# Patient Record
Sex: MALE | Race: WHITE | Employment: FULL TIME | URBAN - METROPOLITAN AREA
[De-identification: names, ages, dates, MRNs, and addresses within clinical notes are randomized per-mention and may not be internally consistent; named-entity substitution may affect disease eponyms.]

---

## 2017-10-02 ENCOUNTER — PATIENT MESSAGE (OUTPATIENT)
Dept: FAMILY MEDICINE CLINIC | Facility: CLINIC | Age: 47
End: 2017-10-02

## 2017-10-02 NOTE — TELEPHONE ENCOUNTER
From: Angel Pradhan  To: Annie Agee MD  Sent: 10/2/2017 11:00 AM CDT  Subject: Other    I'm late setting up a physical for this year - need one done by Nov. 1st, we are now required to have one annually to receive the discounted insurance rate.  In orde

## 2017-10-09 ENCOUNTER — OFFICE VISIT (OUTPATIENT)
Dept: FAMILY MEDICINE CLINIC | Facility: CLINIC | Age: 47
End: 2017-10-09

## 2017-10-09 VITALS
HEIGHT: 78 IN | BODY MASS INDEX: 21.75 KG/M2 | SYSTOLIC BLOOD PRESSURE: 106 MMHG | HEART RATE: 64 BPM | OXYGEN SATURATION: 98 % | RESPIRATION RATE: 16 BRPM | DIASTOLIC BLOOD PRESSURE: 78 MMHG | TEMPERATURE: 98 F | WEIGHT: 188 LBS

## 2017-10-09 DIAGNOSIS — Z00.00 ANNUAL PHYSICAL EXAM: Primary | ICD-10-CM

## 2017-10-09 PROCEDURE — 99396 PREV VISIT EST AGE 40-64: CPT | Performed by: FAMILY MEDICINE

## 2017-10-09 NOTE — H&P
Raj Guerrero is a 41year old male who presents for a complete physical exam.   HPI:   Pt complains of nothing. .         Current Outpatient Prescriptions:  Fish Oil Does not apply Oil by Does not apply route.  Disp:  Rfl:    Cholecalciferol (VITAMIN D) 2000 easy bruising or bleeding  ENDOCRINE:denies frequent thirst or urination, denies unintentional weight gain/loss  ALL/ASTHMA: denies hx of allergy or asthma    EXAM:   /78   Pulse 64   Temp 97.6 °F (36.4 °C) (Oral)   Resp 16   Ht 80\"   Wt 188 lb   Sp

## 2018-01-19 ENCOUNTER — TELEPHONE (OUTPATIENT)
Dept: FAMILY MEDICINE CLINIC | Facility: CLINIC | Age: 48
End: 2018-01-19

## 2018-01-19 RX ORDER — OSELTAMIVIR PHOSPHATE 75 MG/1
75 CAPSULE ORAL DAILY
Qty: 10 CAPSULE | Refills: 0 | Status: SHIPPED | OUTPATIENT
Start: 2018-01-19 | End: 2018-08-17

## 2018-01-19 NOTE — TELEPHONE ENCOUNTER
Son diagnosed with Influenza A today. He is asymptomatic. Wants to know if he should be on Tamiflu.  Please advise

## 2018-08-16 NOTE — PROGRESS NOTES
HPI:   Terra Mena is a 57year old male here for complaints of back pain. Pain is located at low back, mid back. Pain is described as aching, sharp, shooting. Severity is moderate. The pain radiates to no radiation of pain.  Pain was precipitated by unknown above  NEURO: denies paresthesias or LE weakness    EXAM:   /62   Pulse 90   Temp 97.9 °F (36.6 °C) (Oral)   Resp 16   Wt 185 lb   SpO2 99%   BMI 20.32 kg/m²     Body mass index is 20.32 kg/m².   GENERAL: well developed, well nourished,in no apparent

## 2018-08-17 ENCOUNTER — HOSPITAL ENCOUNTER (OUTPATIENT)
Dept: GENERAL RADIOLOGY | Age: 48
Discharge: HOME | End: 2018-08-17
Attending: PHYSICIAN ASSISTANT
Payer: COMMERCIAL

## 2018-08-17 ENCOUNTER — OFFICE VISIT (OUTPATIENT)
Dept: FAMILY MEDICINE CLINIC | Facility: CLINIC | Age: 48
End: 2018-08-17
Payer: COMMERCIAL

## 2018-08-17 VITALS
TEMPERATURE: 98 F | HEART RATE: 90 BPM | DIASTOLIC BLOOD PRESSURE: 62 MMHG | OXYGEN SATURATION: 99 % | SYSTOLIC BLOOD PRESSURE: 108 MMHG | WEIGHT: 185 LBS | RESPIRATION RATE: 16 BRPM | BODY MASS INDEX: 20 KG/M2

## 2018-08-17 DIAGNOSIS — G89.29 CHRONIC BILATERAL LOW BACK PAIN WITHOUT SCIATICA: ICD-10-CM

## 2018-08-17 DIAGNOSIS — G89.29 CHRONIC BILATERAL LOW BACK PAIN WITHOUT SCIATICA: Primary | ICD-10-CM

## 2018-08-17 DIAGNOSIS — M54.50 CHRONIC BILATERAL LOW BACK PAIN WITHOUT SCIATICA: ICD-10-CM

## 2018-08-17 DIAGNOSIS — M54.9 MID BACK PAIN: ICD-10-CM

## 2018-08-17 DIAGNOSIS — M54.50 CHRONIC BILATERAL LOW BACK PAIN WITHOUT SCIATICA: Primary | ICD-10-CM

## 2018-08-17 PROCEDURE — 99214 OFFICE O/P EST MOD 30 MIN: CPT | Performed by: PHYSICIAN ASSISTANT

## 2018-08-17 PROCEDURE — 72072 X-RAY EXAM THORAC SPINE 3VWS: CPT | Performed by: PHYSICIAN ASSISTANT

## 2018-08-17 PROCEDURE — 72110 X-RAY EXAM L-2 SPINE 4/>VWS: CPT | Performed by: PHYSICIAN ASSISTANT

## 2018-08-17 RX ORDER — CYCLOBENZAPRINE HCL 5 MG
5 TABLET ORAL NIGHTLY
Qty: 10 TABLET | Refills: 0 | Status: SHIPPED | OUTPATIENT
Start: 2018-08-17

## 2018-09-10 ENCOUNTER — OFFICE VISIT (OUTPATIENT)
Dept: FAMILY MEDICINE CLINIC | Facility: CLINIC | Age: 48
End: 2018-09-10
Payer: COMMERCIAL

## 2018-09-10 VITALS
DIASTOLIC BLOOD PRESSURE: 68 MMHG | OXYGEN SATURATION: 99 % | SYSTOLIC BLOOD PRESSURE: 110 MMHG | HEART RATE: 68 BPM | RESPIRATION RATE: 20 BRPM | TEMPERATURE: 98 F | WEIGHT: 185 LBS | BODY MASS INDEX: 21.4 KG/M2 | HEIGHT: 78 IN

## 2018-09-10 DIAGNOSIS — Z23 NEED FOR TDAP VACCINATION: ICD-10-CM

## 2018-09-10 DIAGNOSIS — Z00.00 ANNUAL PHYSICAL EXAM: Primary | ICD-10-CM

## 2018-09-10 PROCEDURE — 90471 IMMUNIZATION ADMIN: CPT | Performed by: FAMILY MEDICINE

## 2018-09-10 PROCEDURE — 90715 TDAP VACCINE 7 YRS/> IM: CPT | Performed by: FAMILY MEDICINE

## 2018-09-10 PROCEDURE — 99396 PREV VISIT EST AGE 40-64: CPT | Performed by: FAMILY MEDICINE

## 2018-09-10 NOTE — H&P
Joel Fernandes is a 57year old male who presents for a complete physical exam.   HPI:   Pt complains of nothing. Current Outpatient Medications:  Fish Oil Does not apply Oil by Does not apply route.  Disp:  Rfl:    Cholecalciferol (VITAMIN D) 2000 UNITS denies depression or anxiety  HEMATOLOGIC: denies hx of anemia, easy bruising or bleeding  ENDOCRINE:denies frequent thirst or urination, denies unintentional weight gain/loss  ALL/ASTHMA: denies hx of allergy or asthma    EXAM:   /68   Pulse 68   Te Status: Future          Standing Expiration Date: 9/10/2019      Comp Metabolic Panel (14)          Standing Status: Future          Standing Expiration Date: 9/10/2019      Vitamin D, 25-Hydroxy [E]          Standing Status: Future          Standing Expir

## 2018-09-14 ENCOUNTER — LAB ENCOUNTER (OUTPATIENT)
Dept: LAB | Age: 48
End: 2018-09-14
Attending: FAMILY MEDICINE
Payer: COMMERCIAL

## 2018-09-14 DIAGNOSIS — Z00.00 ANNUAL PHYSICAL EXAM: ICD-10-CM

## 2018-09-14 LAB
ALBUMIN SERPL-MCNC: 4.1 G/DL (ref 3.5–4.8)
ALBUMIN/GLOB SERPL: 1.2 {RATIO} (ref 1–2)
ALP LIVER SERPL-CCNC: 101 U/L (ref 45–117)
ALT SERPL-CCNC: 36 U/L (ref 17–63)
ANION GAP SERPL CALC-SCNC: 4 MMOL/L (ref 0–18)
AST SERPL-CCNC: 30 U/L (ref 15–41)
BASOPHILS # BLD AUTO: 0.04 X10(3) UL (ref 0–0.1)
BASOPHILS NFR BLD AUTO: 0.8 %
BILIRUB SERPL-MCNC: 1.5 MG/DL (ref 0.1–2)
BUN BLD-MCNC: 21 MG/DL (ref 8–20)
BUN/CREAT SERPL: 20.2 (ref 10–20)
CALCIUM BLD-MCNC: 9 MG/DL (ref 8.3–10.3)
CHLORIDE SERPL-SCNC: 105 MMOL/L (ref 101–111)
CHOLEST SMN-MCNC: 124 MG/DL (ref ?–200)
CO2 SERPL-SCNC: 31 MMOL/L (ref 22–32)
COMPLEXED PSA SERPL-MCNC: 0.88 NG/ML (ref 0.01–4)
CREAT BLD-MCNC: 1.04 MG/DL (ref 0.7–1.3)
EOSINOPHIL # BLD AUTO: 0.19 X10(3) UL (ref 0–0.3)
EOSINOPHIL NFR BLD AUTO: 3.7 %
ERYTHROCYTE [DISTWIDTH] IN BLOOD BY AUTOMATED COUNT: 12.6 % (ref 11.5–16)
GLOBULIN PLAS-MCNC: 3.4 G/DL (ref 2.5–4)
GLUCOSE BLD-MCNC: 84 MG/DL (ref 70–99)
HCT VFR BLD AUTO: 44.8 % (ref 37–53)
HDLC SERPL-MCNC: 29 MG/DL (ref 40–59)
HGB BLD-MCNC: 14.4 G/DL (ref 13–17)
IMMATURE GRANULOCYTE COUNT: 0.01 X10(3) UL (ref 0–1)
IMMATURE GRANULOCYTE RATIO %: 0.2 %
LDLC SERPL CALC-MCNC: 77 MG/DL (ref ?–100)
LYMPHOCYTES # BLD AUTO: 1.38 X10(3) UL (ref 0.9–4)
LYMPHOCYTES NFR BLD AUTO: 26.6 %
M PROTEIN MFR SERPL ELPH: 7.5 G/DL (ref 6.1–8.3)
MCH RBC QN AUTO: 29.1 PG (ref 27–33.2)
MCHC RBC AUTO-ENTMCNC: 32.1 G/DL (ref 31–37)
MCV RBC AUTO: 90.5 FL (ref 80–99)
MONOCYTES # BLD AUTO: 0.52 X10(3) UL (ref 0.1–1)
MONOCYTES NFR BLD AUTO: 10 %
NEUTROPHIL ABS PRELIM: 3.05 X10 (3) UL (ref 1.3–6.7)
NEUTROPHILS # BLD AUTO: 3.05 X10(3) UL (ref 1.3–6.7)
NEUTROPHILS NFR BLD AUTO: 58.7 %
NONHDLC SERPL-MCNC: 95 MG/DL (ref ?–130)
OSMOLALITY SERPL CALC.SUM OF ELEC: 292 MOSM/KG (ref 275–295)
PLATELET # BLD AUTO: 226 10(3)UL (ref 150–450)
POTASSIUM SERPL-SCNC: 4.4 MMOL/L (ref 3.6–5.1)
RBC # BLD AUTO: 4.95 X10(6)UL (ref 4.3–5.7)
RED CELL DISTRIBUTION WIDTH-SD: 41.5 FL (ref 35.1–46.3)
SODIUM SERPL-SCNC: 140 MMOL/L (ref 136–144)
TRIGL SERPL-MCNC: 90 MG/DL (ref 30–149)
VIT D+METAB SERPL-MCNC: 34.7 NG/ML (ref 30–100)
VLDLC SERPL CALC-MCNC: 18 MG/DL (ref 0–30)
WBC # BLD AUTO: 5.2 X10(3) UL (ref 4–13)

## 2018-09-14 PROCEDURE — 36415 COLL VENOUS BLD VENIPUNCTURE: CPT

## 2018-09-14 PROCEDURE — 82306 VITAMIN D 25 HYDROXY: CPT

## 2018-09-14 PROCEDURE — 85025 COMPLETE CBC W/AUTO DIFF WBC: CPT

## 2018-09-14 PROCEDURE — 80061 LIPID PANEL: CPT

## 2018-09-14 PROCEDURE — 80053 COMPREHEN METABOLIC PANEL: CPT

## 2018-09-29 ENCOUNTER — TELEPHONE (OUTPATIENT)
Dept: FAMILY MEDICINE CLINIC | Facility: CLINIC | Age: 48
End: 2018-09-29

## 2018-09-29 NOTE — TELEPHONE ENCOUNTER
PT DROPPED FORM OFF FOR ANNUAL PX VERIFICATION. HAD PX 2 WEEKS AGO. PLEASE CALL PT WHEN READY FOR . FORM IN YP TRIAGE BIN    ALSO PLEASE CALL PT ON CELL TO DISCUSS LAB RESULTS.

## 2018-10-02 NOTE — TELEPHONE ENCOUNTER
Form completed and left at  for . Copy sent to scan, copy in triage accordian, pt notified,      See lab results.

## 2018-10-17 ENCOUNTER — TELEPHONE (OUTPATIENT)
Dept: FAMILY MEDICINE CLINIC | Facility: CLINIC | Age: 48
End: 2018-10-17

## 2018-10-23 DIAGNOSIS — R79.9 ELEVATED BUN: Primary | ICD-10-CM

## 2018-10-26 ENCOUNTER — TELEPHONE (OUTPATIENT)
Dept: FAMILY MEDICINE CLINIC | Facility: CLINIC | Age: 48
End: 2018-10-26

## 2018-10-26 NOTE — TELEPHONE ENCOUNTER
Elsie Pa px therapy assistant from [facility] px therapy stated they sent us progress notes to request additional therapy and we have not responded, px therapist is recommending additional therapy, does sarai Zheng wants to continue px therapy?  If yes they need the

## 2018-10-26 NOTE — TELEPHONE ENCOUNTER
10/17/18 Lela Watkins \"Paperwork was Faxed over to [employer] . Fax # [phone]. Then put to scan. \" can refax paperwork.

## 2018-12-24 ENCOUNTER — APPOINTMENT (OUTPATIENT)
Dept: LAB | Age: 48
End: 2018-12-24
Attending: FAMILY MEDICINE
Payer: COMMERCIAL

## 2018-12-24 DIAGNOSIS — R79.9 ELEVATED BUN: ICD-10-CM

## 2018-12-24 PROCEDURE — 36415 COLL VENOUS BLD VENIPUNCTURE: CPT

## 2018-12-24 PROCEDURE — 80048 BASIC METABOLIC PNL TOTAL CA: CPT

## 2020-06-21 ENCOUNTER — HOSPITAL ENCOUNTER (OUTPATIENT)
Age: 50
Discharge: HOME | End: 2020-06-21
Attending: FAMILY MEDICINE
Payer: COMMERCIAL

## 2020-06-21 VITALS
DIASTOLIC BLOOD PRESSURE: 64 MMHG | HEART RATE: 70 BPM | OXYGEN SATURATION: 98 % | RESPIRATION RATE: 18 BRPM | SYSTOLIC BLOOD PRESSURE: 110 MMHG | TEMPERATURE: 98 F

## 2020-06-21 DIAGNOSIS — S50.861A INSECT BITE OF RIGHT FOREARM, INITIAL ENCOUNTER: ICD-10-CM
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
## 2020-06-21 DIAGNOSIS — L03.113 CELLULITIS OF RIGHT UPPER EXTREMITY: Primary | ICD-10-CM

## 2020-06-21 PROCEDURE — 99204 OFFICE O/P NEW MOD 45 MIN: CPT

## 2020-06-21 PROCEDURE — 99213 OFFICE O/P EST LOW 20 MIN: CPT

## 2020-06-21 RX ORDER — SULFAMETHOXAZOLE AND TRIMETHOPRIM 800; 160 MG/1; MG/1
1 TABLET ORAL 2 TIMES DAILY
Qty: 20 TABLET | Refills: 0 | Status: SHIPPED | OUTPATIENT
Start: 2020-06-21

## 2020-06-21 NOTE — ED PROVIDER NOTES
Patient Seen in: [facility] END      History   Patient presents with:  Cellulitis    Stated Complaint: BUG BITE RIGHT ARM    HPI    This 53-year-old male presents to the office with complaint of redness to the volar aspect of the fo above.    Physical Exam     ED Triage Vitals [06/21/20 1605]   /64   Pulse 70   Resp 18   Temp 98.3 °F (36.8 °C)   Temp src Oral   SpO2 98 %   O2 Device        Current:/64   Pulse 70   Temp 98.3 °F (36.8 °C) (Oral)   Resp 18   SpO2 98% or red streaking up the arm.       Disposition and Plan     Clinical Impression:  Cellulitis of right upper extremity  (primary encounter diagnosis)  Insect bite of right forearm, initial encounter    Disposition:  Discharge  6/21/2020  4:44 pm    Follow-up

## 2020-06-27 ENCOUNTER — HOSPITAL ENCOUNTER (OUTPATIENT)
Age: 50
Discharge: HOME | End: 2020-06-27
Attending: FAMILY MEDICINE
Payer: COMMERCIAL

## 2020-06-27 VITALS
DIASTOLIC BLOOD PRESSURE: 59 MMHG | HEART RATE: 87 BPM | SYSTOLIC BLOOD PRESSURE: 113 MMHG | RESPIRATION RATE: 20 BRPM | OXYGEN SATURATION: 97 % | TEMPERATURE: 99 F

## 2020-06-27 DIAGNOSIS — L23.7 POISON IVY DERMATITIS: Primary | ICD-10-CM

## 2020-06-27 PROCEDURE — 99214 OFFICE O/P EST MOD 30 MIN: CPT

## 2020-06-27 PROCEDURE — 99213 OFFICE O/P EST LOW 20 MIN: CPT

## 2020-06-27 RX ORDER — CLOBETASOL PROPIONATE 0.5 MG/G
1 OINTMENT TOPICAL 2 TIMES DAILY
Qty: 60 G | Refills: 1 | Status: SHIPPED | OUTPATIENT
Start: 2020-06-27 | End: 2020-07-07

## 2020-06-27 RX ORDER — PREDNISONE 10 MG/1
TABLET ORAL
Qty: 30 TABLET | Refills: 0 | Status: SHIPPED | OUTPATIENT
Start: 2020-06-27

## 2020-06-27 NOTE — ED INITIAL ASSESSMENT (HPI)
Rash - started Saturday. Pt was seen Sunday. Pt thinks it was an insect or tick bite due to a black discoloration in the center, Per pt  Provider  digged into the bite area. Denies fever. Pt on antibiotic Bactrim on day 6 of 10.  Pt states rash now spread t

## 2020-06-27 NOTE — ED PROVIDER NOTES
Patient Seen in: [facility]      History   No chief complaint on file.     Stated Complaint: Rash for 1 wk    HPI    53-year-old male presents today for evaluation of worsening rash to his right forearm right side of his chest cristi without any redness  Oropharynx :  No exudates  Neck supple full range of motion,  Lungs clear to auscultation bilaterally  Heart S1-S2 heard no murmurs no gallops  Skin to the right forearm shows multiple grouped vesicles with localized erythema large pat

## 2023-03-06 ENCOUNTER — PATIENT MESSAGE (OUTPATIENT)
Dept: FAMILY MEDICINE CLINIC | Facility: CLINIC | Age: 53
End: 2023-03-06

## 2023-03-06 NOTE — TELEPHONE ENCOUNTER
From: Emperatriz Camarena  To: Vance Andrews DO  Sent: 3/6/2023 11:09 AM CST  Subject: Appointment / annual physical    I made an appointment (Monday March 13th) to have a mole on my face looked at however i have not had a physical in a number of years. If i schedule a physical would you also look at the mole? Question is, do i need separate appointments?     Thanks,  Candido Barros

## 2023-03-13 ENCOUNTER — OFFICE VISIT (OUTPATIENT)
Dept: FAMILY MEDICINE CLINIC | Facility: CLINIC | Age: 53
End: 2023-03-13
Payer: COMMERCIAL

## 2023-03-13 VITALS
HEART RATE: 58 BPM | WEIGHT: 183 LBS | OXYGEN SATURATION: 98 % | HEIGHT: 78 IN | BODY MASS INDEX: 21.17 KG/M2 | DIASTOLIC BLOOD PRESSURE: 62 MMHG | RESPIRATION RATE: 16 BRPM | SYSTOLIC BLOOD PRESSURE: 122 MMHG

## 2023-03-13 DIAGNOSIS — Z12.11 COLON CANCER SCREENING: ICD-10-CM

## 2023-03-13 DIAGNOSIS — D22.9 ATYPICAL MOLE: ICD-10-CM

## 2023-03-13 DIAGNOSIS — Z00.00 ANNUAL PHYSICAL EXAM: Primary | ICD-10-CM

## 2023-03-13 DIAGNOSIS — Z23 NEED FOR SHINGLES VACCINE: ICD-10-CM

## 2023-03-17 ENCOUNTER — LAB ENCOUNTER (OUTPATIENT)
Dept: LAB | Age: 53
End: 2023-03-17
Attending: FAMILY MEDICINE
Payer: COMMERCIAL

## 2023-03-17 DIAGNOSIS — Z00.00 ANNUAL PHYSICAL EXAM: ICD-10-CM

## 2023-03-17 LAB
ALBUMIN SERPL-MCNC: 3.9 G/DL (ref 3.4–5)
ALBUMIN/GLOB SERPL: 1.1 {RATIO} (ref 1–2)
ALP LIVER SERPL-CCNC: 102 U/L
ALT SERPL-CCNC: 37 U/L
ANION GAP SERPL CALC-SCNC: 4 MMOL/L (ref 0–18)
AST SERPL-CCNC: 34 U/L (ref 15–37)
BASOPHILS # BLD AUTO: 0.05 X10(3) UL (ref 0–0.2)
BASOPHILS NFR BLD AUTO: 1 %
BILIRUB SERPL-MCNC: 1.6 MG/DL (ref 0.1–2)
BUN BLD-MCNC: 16 MG/DL (ref 7–18)
CALCIUM BLD-MCNC: 9.4 MG/DL (ref 8.5–10.1)
CHLORIDE SERPL-SCNC: 107 MMOL/L (ref 98–112)
CHOLEST SERPL-MCNC: 115 MG/DL (ref ?–200)
CO2 SERPL-SCNC: 30 MMOL/L (ref 21–32)
COMPLEXED PSA SERPL-MCNC: 0.93 NG/ML (ref ?–4)
CREAT BLD-MCNC: 1.06 MG/DL
EOSINOPHIL # BLD AUTO: 0.24 X10(3) UL (ref 0–0.7)
EOSINOPHIL NFR BLD AUTO: 4.9 %
ERYTHROCYTE [DISTWIDTH] IN BLOOD BY AUTOMATED COUNT: 12.8 %
FASTING PATIENT LIPID ANSWER: YES
FASTING STATUS PATIENT QL REPORTED: YES
GFR SERPLBLD BASED ON 1.73 SQ M-ARVRAT: 85 ML/MIN/1.73M2 (ref 60–?)
GLOBULIN PLAS-MCNC: 3.7 G/DL (ref 2.8–4.4)
GLUCOSE BLD-MCNC: 92 MG/DL (ref 70–99)
HCT VFR BLD AUTO: 45.1 %
HDLC SERPL-MCNC: 32 MG/DL (ref 40–59)
HGB BLD-MCNC: 14.5 G/DL
IMM GRANULOCYTES # BLD AUTO: 0.01 X10(3) UL (ref 0–1)
IMM GRANULOCYTES NFR BLD: 0.2 %
LDLC SERPL CALC-MCNC: 69 MG/DL (ref ?–100)
LYMPHOCYTES # BLD AUTO: 1.46 X10(3) UL (ref 1–4)
LYMPHOCYTES NFR BLD AUTO: 30.1 %
MCH RBC QN AUTO: 29.3 PG (ref 26–34)
MCHC RBC AUTO-ENTMCNC: 32.2 G/DL (ref 31–37)
MCV RBC AUTO: 91.1 FL
MONOCYTES # BLD AUTO: 0.83 X10(3) UL (ref 0.1–1)
MONOCYTES NFR BLD AUTO: 17.1 %
NEUTROPHILS # BLD AUTO: 2.26 X10 (3) UL (ref 1.5–7.7)
NEUTROPHILS # BLD AUTO: 2.26 X10(3) UL (ref 1.5–7.7)
NEUTROPHILS NFR BLD AUTO: 46.7 %
NONHDLC SERPL-MCNC: 83 MG/DL (ref ?–130)
OSMOLALITY SERPL CALC.SUM OF ELEC: 293 MOSM/KG (ref 275–295)
PLATELET # BLD AUTO: 195 10(3)UL (ref 150–450)
POTASSIUM SERPL-SCNC: 4.3 MMOL/L (ref 3.5–5.1)
PROT SERPL-MCNC: 7.6 G/DL (ref 6.4–8.2)
RBC # BLD AUTO: 4.95 X10(6)UL
SODIUM SERPL-SCNC: 141 MMOL/L (ref 136–145)
TRIGL SERPL-MCNC: 68 MG/DL (ref 30–149)
TSI SER-ACNC: 3.47 MIU/ML (ref 0.36–3.74)
VLDLC SERPL CALC-MCNC: 10 MG/DL (ref 0–30)
WBC # BLD AUTO: 4.9 X10(3) UL (ref 4–11)

## 2023-03-17 PROCEDURE — 80053 COMPREHEN METABOLIC PANEL: CPT

## 2023-03-17 PROCEDURE — 84443 ASSAY THYROID STIM HORMONE: CPT

## 2023-03-17 PROCEDURE — 80061 LIPID PANEL: CPT

## 2023-03-17 PROCEDURE — 36415 COLL VENOUS BLD VENIPUNCTURE: CPT

## 2023-03-17 PROCEDURE — 85025 COMPLETE CBC W/AUTO DIFF WBC: CPT

## 2023-08-15 ENCOUNTER — NURSE ONLY (OUTPATIENT)
Dept: FAMILY MEDICINE CLINIC | Facility: CLINIC | Age: 53
End: 2023-08-15
Payer: COMMERCIAL

## 2023-08-15 PROCEDURE — 90471 IMMUNIZATION ADMIN: CPT | Performed by: FAMILY MEDICINE

## 2023-08-15 PROCEDURE — 90750 HZV VACC RECOMBINANT IM: CPT | Performed by: FAMILY MEDICINE

## 2023-12-21 ENCOUNTER — OFFICE VISIT (OUTPATIENT)
Dept: FAMILY MEDICINE CLINIC | Facility: CLINIC | Age: 53
End: 2023-12-21
Payer: COMMERCIAL

## 2023-12-21 VITALS
BODY MASS INDEX: 20.71 KG/M2 | DIASTOLIC BLOOD PRESSURE: 62 MMHG | HEIGHT: 78 IN | WEIGHT: 179 LBS | SYSTOLIC BLOOD PRESSURE: 100 MMHG

## 2023-12-21 DIAGNOSIS — I73.00 RAYNAUD'S PHENOMENON WITHOUT GANGRENE: ICD-10-CM

## 2023-12-21 DIAGNOSIS — B35.1 ONYCHOMYCOSIS: Primary | ICD-10-CM

## 2023-12-21 PROCEDURE — 87101 SKIN FUNGI CULTURE: CPT | Performed by: FAMILY MEDICINE

## 2023-12-21 PROCEDURE — 3008F BODY MASS INDEX DOCD: CPT | Performed by: FAMILY MEDICINE

## 2023-12-21 PROCEDURE — 99214 OFFICE O/P EST MOD 30 MIN: CPT | Performed by: FAMILY MEDICINE

## 2023-12-21 PROCEDURE — 3074F SYST BP LT 130 MM HG: CPT | Performed by: FAMILY MEDICINE

## 2023-12-21 PROCEDURE — 3078F DIAST BP <80 MM HG: CPT | Performed by: FAMILY MEDICINE

## 2023-12-22 NOTE — PATIENT INSTRUCTIONS
Raynaud's phenomenon can be treated with calcium channel blockers such as diltiazem or Cardizem. We run the risk of making your blood pressure too low which can lead to fatigue or dizziness or even fainting.

## 2024-01-03 ENCOUNTER — PATIENT MESSAGE (OUTPATIENT)
Dept: FAMILY MEDICINE CLINIC | Facility: CLINIC | Age: 54
End: 2024-01-03

## 2024-01-03 DIAGNOSIS — B35.1 ONYCHOMYCOSIS: Primary | ICD-10-CM

## 2024-01-04 RX ORDER — NYSTATIN 100000 U/G
1 CREAM TOPICAL 2 TIMES DAILY
Qty: 30 G | Refills: 2 | Status: SHIPPED | OUTPATIENT
Start: 2024-01-04

## 2024-01-05 NOTE — TELEPHONE ENCOUNTER
From: Bar Bernstein  To: Dedrick Traylor  Sent: 1/3/2024 11:31 AM CST  Subject: Culture result    Final results shows a Candida species. This is different than the typical fungus that we see on toenails. Nystatin cream.

## 2024-01-25 ENCOUNTER — PATIENT MESSAGE (OUTPATIENT)
Dept: FAMILY MEDICINE CLINIC | Facility: CLINIC | Age: 54
End: 2024-01-25

## 2024-01-25 DIAGNOSIS — B35.1 ONYCHOMYCOSIS: Primary | ICD-10-CM

## 2024-01-25 NOTE — TELEPHONE ENCOUNTER
From: Bar Bernstein  To: Dedrick Traylor  Sent: 1/25/2024 10:10 AM CST  Subject: Nail culture    Our culture is now growing Trichophyton rubra, one of the fungus that causes nail infections. We can try a different topical, Penlac, or go to oral treatment such as Nizoral or Sporonox. the oral treatments do run a small risk of liver toxicity. Liver function should be tested after 6 and 12 weeks.

## 2024-03-07 ENCOUNTER — OFFICE VISIT (OUTPATIENT)
Dept: FAMILY MEDICINE CLINIC | Facility: CLINIC | Age: 54
End: 2024-03-07
Payer: COMMERCIAL

## 2024-03-07 VITALS
DIASTOLIC BLOOD PRESSURE: 64 MMHG | OXYGEN SATURATION: 99 % | WEIGHT: 182 LBS | HEART RATE: 65 BPM | BODY MASS INDEX: 21.06 KG/M2 | SYSTOLIC BLOOD PRESSURE: 110 MMHG | RESPIRATION RATE: 16 BRPM | TEMPERATURE: 98 F | HEIGHT: 78 IN

## 2024-03-07 DIAGNOSIS — M54.50 ACUTE BILATERAL LOW BACK PAIN WITHOUT SCIATICA: Primary | ICD-10-CM

## 2024-03-07 PROCEDURE — 99213 OFFICE O/P EST LOW 20 MIN: CPT | Performed by: NURSE PRACTITIONER

## 2024-03-07 RX ORDER — CYCLOBENZAPRINE HCL 10 MG
10 TABLET ORAL 3 TIMES DAILY
Qty: 30 TABLET | Refills: 0 | Status: SHIPPED | OUTPATIENT
Start: 2024-03-07

## 2024-03-07 NOTE — PROGRESS NOTES
Chief Complaint   Patient presents with    Back Pain     tightness in lower back starting Jarvis 3/3.  Also have some numbness in right thumb / hand would like to address, hand and thumb x 1 month. - Entered by patient started to go jogging,          HPI:  This is a 53 year old male who presents today with complaints of bilateral low back pain that began about 4 days ago after extended time traveling in car and jogging for exercise. Tight in character, No radiation. No new bowel or bladder incontinence.  No weakness.  No numbness or tingling.  3/10. Persisting.      Current Outpatient Medications   Medication Sig Dispense Refill    cyclobenzaprine 10 MG Oral Tab Take 1 tablet (10 mg total) by mouth 3 (three) times daily. 30 tablet 0    Ciclopirox 8 % External Solution Apply to nail every night.  Remove with alcohol after 7 days. 6.6 mL 2    nystatin 100,000 Units/g External Cream Apply 1 Application topically 2 (two) times daily. 30 g 2    Cholecalciferol (VITAMIN D) 2000 UNITS Oral Cap Take by mouth.        Past Medical History:   Diagnosis Date    Acute upper respiratory infections of unspecified site     Calcifying tendinitis of shoulder     Loss of weight     Other malaise and fatigue     Pain in joint, shoulder region     Pectus excavatum     Personal history of pneumonia (recurrent)     Scabies     Sprain of thoracic region       Past Surgical History:   Procedure Laterality Date    VASECTOMY  1/1/04      Social History:   Social History     Socioeconomic History    Marital status:    Tobacco Use    Smoking status: Never    Smokeless tobacco: Never   Vaping Use    Vaping Use: Never used   Substance and Sexual Activity    Alcohol use: No    Drug use: No   Other Topics Concern    Caffeine Concern No    Exercise Yes           ROS:  GEN: no fever, no chills, no fatigue  CHEST: no chest pains.  SKIN: no rashes  HEM: no ecchymoses  JOINTS: no other joints pain.  NEURO: no tingling, no weakness, no abnormal  sensation.      /64   Pulse 65   Temp 97.9 °F (36.6 °C) (Oral)   Resp 16   Ht 6' 8\" (2.032 m)   Wt 182 lb (82.6 kg)   SpO2 99%   BMI 19.99 kg/m²     PE:  Gen:  WD/WN NAD  HEENT:  PEERLA, EOM-i.  LUNGS:CTA andrew.  HEART:S1/S2 reg., no murmurs, clicks, gallops  SKIN:no rashes on the chest or back.  Back:  Normal on inspection, no pain on palpation of the spinal and paraspinal muscles.   Neuro:  Reflexes at knees 2+ and symmetric      A/P    Encounter Diagnosis   Name Primary?    Acute bilateral low back pain without sciatica Yes     Meds & Refills for this Visit:  Requested Prescriptions     Signed Prescriptions Disp Refills    cyclobenzaprine 10 MG Oral Tab 30 tablet 0     Sig: Take 1 tablet (10 mg total) by mouth 3 (three) times daily.       Imaging & Consults:  None

## 2024-03-08 ENCOUNTER — TELEPHONE (OUTPATIENT)
Dept: FAMILY MEDICINE CLINIC | Facility: CLINIC | Age: 54
End: 2024-03-08

## 2024-03-08 DIAGNOSIS — M54.50 ACUTE BILATERAL LOW BACK PAIN WITHOUT SCIATICA: Primary | ICD-10-CM

## 2024-03-08 RX ORDER — TRAMADOL HYDROCHLORIDE 50 MG/1
50 TABLET ORAL EVERY 6 HOURS PRN
Qty: 30 TABLET | Refills: 1 | Status: SHIPPED | OUTPATIENT
Start: 2024-03-08

## 2024-03-08 NOTE — TELEPHONE ENCOUNTER
Patient called stating that he was seen at the walk in clinic 03/07/2024 and was given a prescription for cyclobenzaprine 10 MG Oral Tab and its not helping his lower back pain at all.      Patient want to know if Dr. WEBB can prescribe him something else for the pain.    If so can he please send to [id number] - [place], [place] - [address] [phone], [phone]       Please advise

## 2024-03-08 NOTE — TELEPHONE ENCOUNTER
Patient seen by Dr Bernstein 12/21/23  Seen at [facility] low back pain  Please advise.  Thank you,

## 2024-03-18 ENCOUNTER — HOSPITAL ENCOUNTER (OUTPATIENT)
Dept: GENERAL RADIOLOGY | Age: 54
Discharge: HOME | End: 2024-03-18
Attending: INTERNAL MEDICINE
Payer: COMMERCIAL

## 2024-03-18 ENCOUNTER — OFFICE VISIT (OUTPATIENT)
Dept: FAMILY MEDICINE CLINIC | Facility: CLINIC | Age: 54
End: 2024-03-18
Payer: COMMERCIAL

## 2024-03-18 VITALS
WEIGHT: 186 LBS | HEART RATE: 67 BPM | SYSTOLIC BLOOD PRESSURE: 108 MMHG | OXYGEN SATURATION: 99 % | DIASTOLIC BLOOD PRESSURE: 62 MMHG | BODY MASS INDEX: 21.52 KG/M2 | HEIGHT: 78 IN

## 2024-03-18 DIAGNOSIS — M25.531 RIGHT WRIST PAIN: ICD-10-CM

## 2024-03-18 DIAGNOSIS — R20.0 HAND NUMBNESS: Primary | ICD-10-CM

## 2024-03-18 DIAGNOSIS — R20.0 HAND NUMBNESS: ICD-10-CM

## 2024-03-18 PROCEDURE — 73110 X-RAY EXAM OF WRIST: CPT | Performed by: INTERNAL MEDICINE

## 2024-03-18 PROCEDURE — 72050 X-RAY EXAM NECK SPINE 4/5VWS: CPT | Performed by: INTERNAL MEDICINE

## 2024-03-18 PROCEDURE — 99214 OFFICE O/P EST MOD 30 MIN: CPT | Performed by: INTERNAL MEDICINE

## 2024-03-18 NOTE — PROGRESS NOTES
Dedrick Traylor is a 53 year old male.  HPI:   Acute intense shooting pain, rare and random in the right wrist for awhile. Maybe a broken bone?  Played Kirstie golf one day and iced the right hand the next day.   Now the affected area feels weird.  Right handed. Computer occupation.   Stretches wrists routinely.     + neck pain.   Admits to trying to use a lumbar support and finds himself slouched, or not with good posture often.  Pt is 6'8\".  Still dealing with a LBP that he saw someone for recently and took advil for a few days, felt no better.  Not wanting to take meds if possible, but will take them if he needs to.   Current Outpatient Medications   Medication Sig Dispense Refill    Ciclopirox 8 % External Solution Apply to nail every night.  Remove with alcohol after 7 days. 6.6 mL 2    Cholecalciferol (VITAMIN D) 2000 UNITS Oral Cap Take by mouth.      nystatin 100,000 Units/g External Cream Apply 1 Application topically 2 (two) times daily. (Patient not taking: Reported on 3/18/2024) 30 g 2      Past Medical History:   Diagnosis Date    Acute upper respiratory infections of unspecified site     Calcifying tendinitis of shoulder     Loss of weight     Other malaise and fatigue     Pain in joint, shoulder region     Pectus excavatum     Personal history of pneumonia (recurrent)     Scabies     Sprain of thoracic region       Social History:  Social History     Socioeconomic History    Marital status:    Tobacco Use    Smoking status: Never    Smokeless tobacco: Never   Vaping Use    Vaping Use: Never used   Substance and Sexual Activity    Alcohol use: No    Drug use: No   Other Topics Concern    Caffeine Concern No    Exercise Yes        REVIEW OF SYSTEMS:   GENERAL HEALTH: feels well otherwise  SKIN: denies rash or redness over AA  RESPIRATORY: denies shortness of breath or cough  CARDIOVASCULAR: denies chest pain or pressure  MS: right thumb numbness, some 2nd metacarpal numbness at times, can go up to the  forearm and elbow. Right hand dominant. Sharp wrist pain randomly as above. Does not wake him from sleep or occur with driving. Changing positions does not alleviate his symptoms.  NEURO: denies headaches, denies dizziness     EXAM:   /62   Pulse 67   Ht 6' 8\" (2.032 m)   Wt 186 lb (84.4 kg)   SpO2 99%   BMI 20.43 kg/m²   GENERAL: well developed, male in no apparent distress  SKIN: warm & dry  HEENT: atraumatic, normocephalic   LUNGS: CTA, easy breathing  CV: normal S1S2, RRR without murmur  MS: full c-spine ROM, no TTP; no right elbow epicondylar TTP; negative forearm squeeze; no wrist TTP; full supination/pronation, lateral movements of right wrist, 2+ radial pulse. Strength 5+    ASSESSMENT AND PLAN:   1. Hand numbness  - suspect cervical radiculopathy as it doesn't correlate to CTS  - discussed all options for symptom mgmt; recommended wrist and thumb splint to wear during the day, keep the wrist in a neutral position; topical voltaren; consider PT if not improving. Ortho referral if not better. Discussed prednisone but will hold at this time.  - XR CERVICAL SPINE (4VIEWS) (CPT=72050); Future  - XR WRIST COMPLETE (MIN 3 VIEWS), RIGHT (CPT=73110); Future  - OP REFERRAL TO [facility]    2. Right wrist pain  -rule out fx  - XR WRIST COMPLETE (MIN 3 VIEWS), RIGHT (CPT=73110); Future  - OP REFERRAL TO [facility]  e patient indicates understanding of these issues and agrees to the plan.  Follow up 2 weeks.

## 2024-11-07 ENCOUNTER — OFFICE VISIT (OUTPATIENT)
Dept: ORTHOPEDICS CLINIC | Facility: CLINIC | Age: 54
End: 2024-11-07
Payer: COMMERCIAL

## 2024-11-07 ENCOUNTER — MED REC SCAN ONLY (OUTPATIENT)
Dept: FAMILY MEDICINE CLINIC | Facility: CLINIC | Age: 54
End: 2024-11-07

## 2024-11-07 VITALS — WEIGHT: 180 LBS | HEIGHT: 78 IN | BODY MASS INDEX: 20.83 KG/M2

## 2024-11-07 DIAGNOSIS — R20.0 HAND NUMBNESS: Primary | ICD-10-CM

## 2024-11-07 DIAGNOSIS — M54.50 ACUTE MIDLINE LOW BACK PAIN WITHOUT SCIATICA: ICD-10-CM

## 2024-11-07 PROCEDURE — 99204 OFFICE O/P NEW MOD 45 MIN: CPT | Performed by: STUDENT IN AN ORGANIZED HEALTH CARE EDUCATION/TRAINING PROGRAM

## 2024-11-07 RX ORDER — MELOXICAM 7.5 MG/1
7.5 TABLET ORAL DAILY
Qty: 14 TABLET | Refills: 0 | Status: SHIPPED | OUTPATIENT
Start: 2024-11-07

## 2024-11-07 NOTE — H&P
[facility]  [address] [place] [address], [address] [address]  [address] [place] [address]  [phone]     NEW PATIENT VISIT - HISTORY AND PHYSICAL EXAMINATION     Name: Dedrick Traylor   MRN: [id number]  Date: 11/07/24       CC: neck and arm pain    REFERRED BY: Good Kim MD    HPI:   Dedrick Traylor is a very pleasant 54 year old male who presents today for evaluation of neck and arm pain. The distribution of symptoms are: 50% neck pain and 50% arm pain. The symptoms began 10 month(s) ago without any significant injury. Since the onset, the symptoms have been improving. The patient reports  numbness and no weakness.  The symptom characteristics are as follows: Patient is a 54-year-old presenting with neck pain radiating to right upper extremity, associated with numbness in the thumb.  Patient also has numbness and tingling in the ulnar nerve distribution.  Patient has been working with physical therapy consistently with persistent stiffness, as well as numbness and tingling.     Patient also endorses low back pain that started approximately 2 weeks ago.  No radicular symptoms no alarming signs.  Pain is in the paraspinous muscles    Prior spine surgery: none.    Bowel and bladder symptoms: absent.    The patient has not had issues with balance and/or hand dexterity problems such as changes in penmanship or the use of buttons or zippers.    Treatment up to this time has included:    Evaluation: PCP and other [facility] provider  NSAIDS: have not been helpful  Narcotic use: None  Physical therapy: Ongoing  Spinal injections: None      PMH:   Past Medical History:    Acute upper respiratory infections of unspecified site    Calcifying tendinitis of shoulder    Loss of weight    Other malaise and fatigue    Pain in joint, shoulder region    Pectus excavatum    Personal history of pneumonia (recurrent)    Scabies    Sprain of thoracic region       PAST SURGICAL HX:  Past  Surgical History:   Procedure Laterality Date    Vasectomy  1/1/04       FAMILY HX:  Family History   Problem Relation Age of Onset    Cancer Mother         breast    Hypertension Father     Heart Disorder Father 68        4v cabg    Lipids Father     Dementia Father 85    Other (thyroid[other]) Sister     Cataracts Sister     Other (thyroid[other]) Sister     Other (thyroid[other]) Sister        ALLERGIES:  Clindamycin and Penicillins    MEDICATIONS:   Current Outpatient Medications   Medication Sig Dispense Refill    Meloxicam 7.5 MG Oral Tab Take 1 tablet (7.5 mg total) by mouth daily. 14 tablet 0    Ciclopirox 8 % External Solution Apply to nail every night.  Remove with alcohol after 7 days. 6.6 mL 2    nystatin 100,000 Units/g External Cream Apply 1 Application topically 2 (two) times daily. (Patient not taking: Reported on 3/18/2024) 30 g 2    Cholecalciferol (VITAMIN D) 2000 UNITS Oral Cap Take by mouth.         ROS: A comprehensive 14 point review of systems was performed and was negative aside from the aforementioned per history of present illness.    SOCIAL HX:  Social History     Tobacco Use    Smoking status: Never    Smokeless tobacco: Never   Substance Use Topics    Alcohol use: No         PE:   Vitals:    11/07/24 1448   Weight: 180 lb (81.6 kg)   Height: 6' 8\" (2.032 m)     Estimated body mass index is 19.77 kg/m² as calculated from the following:    Height as of this encounter: 6' 8\" (2.032 m).    Weight as of this encounter: 180 lb (81.6 kg).    Physical Exam  Constitutional:       Appearance: Normal appearance.   HENT:      Head: Normocephalic and atraumatic.   Eyes:      Extraocular Movements: Extraocular movements intact.   Cardiovascular:      Pulses: Normal pulses. Skin warm and well perfused.  Pulmonary:      Effort: Pulmonary effort is normal. No respiratory distress.   Skin:     General: Skin is warm.   Psychiatric:         Mood and Affect: Mood normal.     Spine Exam:    Normal gait  without difficulty  Able to heel, toe, tandem gait without difficulty  Level shoulders and hips in even stance    Normal C-spine ROM    No tenderness to palpation of C-spine    Spluring: negative    Mora's: negative  IRR: negative  Sustained clonus: negative     and release: normal  Rhomberg: normal    UE Strength: 5/5 D Bi Tri WE FDS IO  UE Sensation: normal in C5-T1 distribution  UE reflexes: normal    +Tinel's at the elbow     Radiographic Examination/Diagnostics:  XR personally viewed, independently interpreted and radiology report was reviewed.  X-ray of the cervical spine demonstrates diffuse degenerative changes in the cervical spine    IMPRESSION: Dedrick Traylor is a 54 year old male with cervical degenerative disk disease and likely radiculopathy.  Patient likely also has cubital tunnel syndrome.    PLAN:     We reviewed the patients history, symptoms, exam findings, and imaging today.  We had a detailed discussion outlining the etiology, anatomy, pathophysiology, and natural history of cervical radiculopathy. The typical management of this condition may include lifestyle modification, NSAIDs, physical therapy, oral steroids, epidural injections, neuromodulatory medications, and sometimes pain medications.    - After my face-to-face evaluation, given the clinical presentation, physical examination, as well as XR findings, there is high suspicion for nerve compression. To further evaluate patient's spinal pain , cervical spine MRI was ordered.   - Prescribed Meloxicam  - Prescribed cubital tunnel brace    FOLLOW-UP:  We will see him back in follow-up in after updated images, or sooner if any problems arise. Patient understands and agrees with plan.       Sunil Engel MD  Orthopedic Spine Surgeon  [facility]   [address] [place] [address], [address] [place] [address] [id number] [place] [address] [id number] [place] [address].org  [email or web address]  t: [phone]   f: [phone]        This  note was dictated using Dragon software.  While it was briefly proofread prior to completion, some grammatical, spelling, and word choice errors due to dictation may still occur.

## 2024-11-08 ENCOUNTER — TELEPHONE (OUTPATIENT)
Dept: ORTHOPEDICS CLINIC | Facility: CLINIC | Age: 54
End: 2024-11-08

## 2024-11-08 NOTE — TELEPHONE ENCOUNTER
Patient called stating Dr Engel had him call [facility] to get a elbow brace. He has not been able to get through to them. Patient said he was told there was another place he can get this at but is not sure where. Please advise

## 2024-11-08 NOTE — TELEPHONE ENCOUNTER
Patient was given the correct phone number for [employer] as his was 1 number off.  It was recommended he follow up with a proper fitting due to height of 6'8.   DME order sent to DME pool for [facility]

## 2024-11-25 ENCOUNTER — OFFICE VISIT (OUTPATIENT)
Dept: ORTHOPEDICS CLINIC | Facility: CLINIC | Age: 54
End: 2024-11-25
Payer: COMMERCIAL

## 2024-11-25 DIAGNOSIS — M54.12 CERVICAL RADICULOPATHY: Primary | ICD-10-CM

## 2024-11-25 PROCEDURE — 99214 OFFICE O/P EST MOD 30 MIN: CPT | Performed by: STUDENT IN AN ORGANIZED HEALTH CARE EDUCATION/TRAINING PROGRAM

## 2024-11-25 NOTE — PROGRESS NOTES
[facility]  [address] [place] [address], [address], [place] [address]  [address] [place] [address]  [phone]     FOLLOW-UP PATIENT VISIT    Name: Dedrick Traylor   MRN: [id number]  Date: 11/25/2024     CC: cervical radiculopathy, likely cubital tunnel syndrome      INTERVAL HISTORY:   Dedrick Traylor is a 54 year old male  follow-up patient whom I have been treating conservatively. Patient returns today for reevaluation of neck and arm pain.     Patient is a 54-year-old presenting with neck pain radiating to right upper extremity, associated with numbness in the thumb.  Patient also has numbness and tingling in the ulnar nerve distribution.  Patient has been working with physical therapy consistently with persistent stiffness, as well as numbness and tingling.     Patient was last seen in clinic, and ordered cubital tunnel brace and MRI.  Patient has not tolerated cubital tunnel brace very well.    Bowel and bladder symptoms: absent.    The patient has not had issues with balance and/or hand dexterity problems such as changes in penmanship or the use of buttons or zippers.    We have tried the following interventions thus far: PT    ROS: No fever/chills or other constitutional issues.    PE:   There were no vitals filed for this visit.  Estimated body mass index is 19.77 kg/m² as calculated from the following:    Height as of 11/7/24: 6' 8\" (2.032 m).    Weight as of 11/7/24: 180 lb (81.6 kg).    On physical examination, he is awake, alert and oriented x 3 and in no acute distress. Mood, affect and language are normal. He appears well developed and well nourished.  He walks without a nonantalgic, nonmyelopathic, non-Trendelenburg gait. Motor strength testing of the upper extremities shows 5/5 strength in bilateral deltoids, biceps, triceps, FDS, interosseus.   Sensation is intact to light touch C5-T1 distributions bilaterally. Reflexes normal. Negative Mora's bilaterally      +Tinel's at the elbow    Radiographic Examination/Diagnostics:  MRI personally viewed, independently interpreted and radiology report was reviewed.  MRI of the cervical spine demonstrates foraminal narrowing C5-6 on the right      IMPRESSION: Dedrick Traylor is a 54 year old male with cervical radiculopathy and likely also cubital tunnel syndrome.  Patient likely had C6 radiculopathy, which improved with therapy, currently more symptomatic from cubital tunnel symptoms    PLAN:   - EMG right upper extremity  - Referred patient to my hand partner for further evaluation    FOLLOW-UP:  We will see him back in follow-up in 3 months, or sooner if any problems arise. Patient understands and agrees with plan.      Sunil Engel MD  Orthopedic Spine Surgeon  [facility]   [address] [place] [address], [address].[facility]  [email or web address]  t: [phone]   f: [phone]        This note was dictated using Dragon software.  While it was briefly proofread prior to completion, some grammatical, spelling, and word choice errors due to dictation may still occur.

## 2024-11-26 ENCOUNTER — PATIENT MESSAGE (OUTPATIENT)
Dept: ORTHOPEDICS CLINIC | Facility: CLINIC | Age: 54
End: 2024-11-26

## 2024-11-26 DIAGNOSIS — M54.12 CERVICAL RADICULOPATHY: Primary | ICD-10-CM

## 2024-11-27 NOTE — TELEPHONE ENCOUNTER
Spoke with patient on phone. Explained why both arms would be ordered. Cancelled the order placed yesterday to keep only the order Dr Engel had placed 11/25 to avoid any further confusion. Pt stated understanding.

## 2024-11-27 NOTE — TELEPHONE ENCOUNTER
LOV 11/25/24  Pt trying to schedule EMG. States order says 2 extremities, but pt thought only right arm was being evaluated.     Pended new order for 1 extremity - right arm. If comparison of left needed, please advise so we can clarify with pt. Thanks!

## 2024-12-04 ENCOUNTER — TELEPHONE (OUTPATIENT)
Facility: CLINIC | Age: 54
End: 2024-12-04

## 2024-12-04 NOTE — TELEPHONE ENCOUNTER
Future Appointments   Date Time Provider Department Center   12/30/2024  1:30 PM Russ Quigley MD [facility]     Please advise if patient needs right arm/elbow xrays.

## 2024-12-09 ENCOUNTER — OFFICE VISIT (OUTPATIENT)
Dept: ORTHOPEDICS CLINIC | Facility: CLINIC | Age: 54
End: 2024-12-09
Payer: COMMERCIAL

## 2024-12-09 VITALS — WEIGHT: 180 LBS | HEIGHT: 78 IN | BODY MASS INDEX: 20.83 KG/M2

## 2024-12-09 DIAGNOSIS — G56.21 CUBITAL TUNNEL SYNDROME ON RIGHT: Primary | ICD-10-CM

## 2024-12-09 PROCEDURE — 99213 OFFICE O/P EST LOW 20 MIN: CPT | Performed by: STUDENT IN AN ORGANIZED HEALTH CARE EDUCATION/TRAINING PROGRAM

## 2024-12-09 NOTE — PROGRESS NOTES
Clinic Note     Allergies[1]    CC: Right upper extremity pain and numbness    HPI: This 54 year old RHD male presents with right upper extremity numbness and tingling. Patient reports symptoms started about 10 months ago.  Right hand numbness/tingling however he has grown to accommodate these symptoms fairly well with daily activities. He has seen my partner Dr. Engel for cervical pathology and Dr. Engel referred Dedrick to see me as he felt that cubital tunnel syndrome was the primary contributor to his symptoms. Dedrick reports primarily numbness in his small finger, radiating from his medial forearm/elbow. He states that several months ago his right thumb was getting numb, but this has largely resolved. Dedrick has recently begun utilizing a cubital tunnel brace.     EMG has not been previously done.     Treatments Tried: cubital tunnel brace    Occupation:     Pain Level: mild    Past Medical History:    Acute upper respiratory infections of unspecified site    Calcifying tendinitis of shoulder    Loss of weight    Other malaise and fatigue    Pain in joint, shoulder region    Pectus excavatum    Personal history of pneumonia (recurrent)    Scabies    Sprain of thoracic region     Past Surgical History:   Procedure Laterality Date    Vasectomy  1/1/04     Current Outpatient Medications   Medication Sig Dispense Refill    Meloxicam 7.5 MG Oral Tab Take 1 tablet (7.5 mg total) by mouth daily. 14 tablet 0    Ciclopirox 8 % External Solution Apply to nail every night.  Remove with alcohol after 7 days. 6.6 mL 2    nystatin 100,000 Units/g External Cream Apply 1 Application topically 2 (two) times daily. (Patient not taking: Reported on 3/18/2024) 30 g 2    Cholecalciferol (VITAMIN D) 2000 UNITS Oral Cap Take by mouth.       Family History   Problem Relation Age of Onset    Cancer Mother         breast    Hypertension Father     Heart Disorder Father 68        4v cabg    Lipids Father     Dementia Father 85     Other (thyroid[other]) Sister     Cataracts Sister     Other (thyroid[other]) Sister     Other (thyroid[other]) Sister      Social History     Occupational History    Not on file   Tobacco Use    Smoking status: Never    Smokeless tobacco: Never   Vaping Use    Vaping status: Never Used   Substance and Sexual Activity    Alcohol use: No    Drug use: No    Sexual activity: Not on file        Review of Systems:  Negative unless stated in HPI.      Physical Exam:  Ht 6' 8\" (2.032 m)   Wt 180 lb (81.6 kg)   BMI 19.77 kg/m²     Constitutional: NAD. AOx3. Well-developed and Well-nourished.   Psychiatric: Normal mood/ affect/ behavior. Judgment and thought content normal.     Right Upper Extremity:   negative Wartenberg sign   negative Froment sign   5/5 FDP to Small Finger  negative Compression test just proximal to the pisiform over the ulnar nerve     Negative Spurling's  Motor grossly intact to AIN, PIN, Ulnar motor nerve distributions  Able to make a composite fist with 0mm fingertip to distal palmar crease  Able to extend digits  Skin intact, warm and well perfused with brisk capillary refill    Median Nerve Exam Right Left   Durkan neg    Sensation normal    PCBr Sensation normal    APB Weakness No    Thenar Atrophy No      Ulnar Nerve Exam Right Left   Tinels positive    EF +    Hypermobility @ elbow neg    Sensation abnormal: slightly diminished over volar and dorsal small finger    1st CHAUNCEY Weakness No    Hypothenar Atrophy No      Radial Nerve Exam  Right Left   Radial Sensory normal normal       Assessment/Plan:  54 year old male with Right cubital tunnel syndrome.    I reviewed the patient's electronic medical record, including the pertinent office notes, medical/surgical history, medications and images. I specifically reviewed the images noted above, independently and discussed my independent interpretation of these images in combination with the pertinent positive and negative findings in my clinical exam  with the patient.    Today I discussed with Dedrick Traylor the significance of the clinical exam findings, and the pathophysiology and pathoanatomy of Cubital Tunnel Syndrome as well as the treatment options. I also reviewed other etiologies of neuropathy including cervical spine pathology, medications and systemic medical conditions, etc. Non-operative modalities include continued observation, nighttime towel-splints, and activity modification. Indications for surgical treatment was discussed. I reviewed the risks, alternatives, and expected outcomes of these treatment options. Specifically I explained the risk of irreversible nerve damage with long-standing peripheral nerve compression, and the clinical signs that we will assess over time to determine if surgical intervention is warranted. I also explained the benefits of EMG/NCS testing.     At this time, given the clinical exam findings and history, Dedrick Traylor will continue cubital tunnel bracing. Given the chronicity of symptoms, he will also obtain an EMG/NCS to better elucidate the severity of the cubital tunnel syndrome, which Dr. Engel already ordered for him.    Follow Up: 6-8 weeks     Russ Quigley MD   Hand, Wrist, & Elbow Surgery  [email or web address]       [1]   Allergies  Allergen Reactions    Clindamycin RASH    Penicillins      Edema

## 2025-02-25 ENCOUNTER — OFFICE VISIT (OUTPATIENT)
Dept: FAMILY MEDICINE CLINIC | Facility: CLINIC | Age: 55
End: 2025-02-25
Payer: COMMERCIAL

## 2025-02-25 VITALS
DIASTOLIC BLOOD PRESSURE: 78 MMHG | BODY MASS INDEX: 20 KG/M2 | OXYGEN SATURATION: 99 % | HEART RATE: 63 BPM | TEMPERATURE: 98 F | WEIGHT: 186 LBS | SYSTOLIC BLOOD PRESSURE: 110 MMHG | RESPIRATION RATE: 18 BRPM

## 2025-02-25 DIAGNOSIS — M54.41 ACUTE BILATERAL LOW BACK PAIN WITH BILATERAL SCIATICA: Primary | ICD-10-CM

## 2025-02-25 DIAGNOSIS — M54.42 ACUTE BILATERAL LOW BACK PAIN WITH BILATERAL SCIATICA: Primary | ICD-10-CM

## 2025-02-25 PROCEDURE — 99213 OFFICE O/P EST LOW 20 MIN: CPT | Performed by: NURSE PRACTITIONER

## 2025-02-25 RX ORDER — NAPROXEN 250 MG/1
250 TABLET ORAL
Qty: 30 TABLET | Refills: 0 | Status: SHIPPED | OUTPATIENT
Start: 2025-02-25 | End: 2025-03-07

## 2025-02-25 RX ORDER — CYCLOBENZAPRINE HCL 10 MG
10 TABLET ORAL 3 TIMES DAILY PRN
Qty: 30 TABLET | Refills: 0 | Status: SHIPPED | OUTPATIENT
Start: 2025-02-25 | End: 2025-03-07

## 2025-02-25 NOTE — PROGRESS NOTES
Subjective:   Patient ID: Dedrick Traylor is a 54 year old male.    Back Pain  This is a new problem. Episode onset: in the past three days. The problem occurs constantly. The pain is present in the lumbar spine. The quality of the pain is described as shooting and stabbing. Radiates to: around abdomen. The pain is at a severity of 7/10. The pain is moderate. Worse during: worse with certain movements. The symptoms are aggravated by twisting and position. Pertinent negatives include no bowel incontinence, dysuria, leg pain, numbness, paresthesias, perianal numbness or weakness. He has tried NSAIDs and home exercises for the symptoms. The treatment provided mild relief.     Patient states before the weekend he drove two hours out of town, sat in a movie theater, slept in a hotel bed and played disc golf. Did not note a pop or injury specifically, notes difficulty with movement such as  putting on shoes, sitting to bending, twisting.   History/Other:   Review of Systems   Gastrointestinal:  Negative for bowel incontinence, constipation and diarrhea.   Genitourinary:  Negative for dysuria.   Musculoskeletal:  Positive for back pain.        As above in HPI   Neurological:  Negative for tremors, weakness, numbness and paresthesias.   All other systems reviewed and are negative.    Current Outpatient Medications   Medication Sig Dispense Refill    Cholecalciferol (VITAMIN D) 2000 UNITS Oral Cap Take by mouth.      Meloxicam 7.5 MG Oral Tab Take 1 tablet (7.5 mg total) by mouth daily. 14 tablet 0    Ciclopirox 8 % External Solution Apply to nail every night.  Remove with alcohol after 7 days. 6.6 mL 2    nystatin 100,000 Units/g External Cream Apply 1 Application topically 2 (two) times daily. (Patient not taking: Reported on 3/18/2024) 30 g 2     Allergies:Allergies[1]    Objective:   Physical Exam  Constitutional:       General: He is not in acute distress.     Appearance: Normal appearance. He is not ill-appearing.   HENT:       Head: Normocephalic and atraumatic.   Cardiovascular:      Rate and Rhythm: Normal rate and regular rhythm.      Pulses: Normal pulses.   Pulmonary:      Effort: Pulmonary effort is normal.   Musculoskeletal:         General: Tenderness present.      Cervical back: Normal range of motion.      Lumbar back: Spasms and tenderness present. No swelling, edema or bony tenderness. Decreased range of motion. No scoliosis.        Back:       Comments: Pain along supraspinatus muscles at lumbar spine, muscle spasms bilaterally noted with forward flexion.   Skin:     General: Skin is warm and dry.      Capillary Refill: Capillary refill takes less than 2 seconds.   Neurological:      General: No focal deficit present.      Mental Status: He is alert.      Motor: No weakness.      Coordination: Coordination normal.   Psychiatric:         Mood and Affect: Mood normal.         Behavior: Behavior normal.         Assessment & Plan:   1. Acute bilateral low back pain with bilateral sciatica        No orders of the defined types were placed in this encounter.      Meds This Visit:  Requested Prescriptions     Signed Prescriptions Disp Refills    cyclobenzaprine 10 MG Oral Tab 30 tablet 0     Sig: Take 1 tablet (10 mg total) by mouth 3 (three) times daily as needed for Muscle spasms.    naproxen 250 MG Oral Tab 30 tablet 0     Sig: Take 1 tablet (250 mg total) by mouth 3 (three) times daily with meals for 10 days.     Patient Instructions   Take Cyclobenzaprine every 8 hours as needed for muscle relaxant. This medication may make you drowsy, works best WITH pain medication to help relax the muscle so you can move.  Take Naproxen with food every 8 hours (with or without Cyclobenzaprine) for pain as needed.   Wait 30-40 minutes then perform stretching exercises as below.  May use covered ice pack to area 5-10 minutes three times daily.  Follow up closely with ortho if symptoms persist. Seek higher level of care if symptoms worsen  (loss of bowel or bladder, worsening pain, radiation down the legs, loss of sensation in groin area, fever, nausea or vomiting).      Patient verbalized understanding and agrees to plan.       [1]   Allergies  Allergen Reactions    Clindamycin RASH    Penicillins      Edema

## 2025-02-25 NOTE — PATIENT INSTRUCTIONS
Take Cyclobenzaprine every 8 hours as needed for muscle relaxant. This medication may make you drowsy, works best WITH pain medication to help relax the muscle so you can move.  Take Naproxen with food every 8 hours (with or without Cyclobenzaprine) for pain as needed.   Wait 30-40 minutes then perform stretching exercises as below.  May use covered ice pack to area 5-10 minutes three times daily.  Follow up closely with ortho if symptoms persist. Seek higher level of care if symptoms worsen (loss of bowel or bladder, worsening pain, radiation down the legs, loss of sensation in groin area, fever, nausea or vomiting).